# Patient Record
Sex: MALE | Race: WHITE | NOT HISPANIC OR LATINO | URBAN - METROPOLITAN AREA
[De-identification: names, ages, dates, MRNs, and addresses within clinical notes are randomized per-mention and may not be internally consistent; named-entity substitution may affect disease eponyms.]

---

## 2021-06-01 ENCOUNTER — RECORDS - HEALTHEAST (OUTPATIENT)
Dept: ADMINISTRATIVE | Facility: CLINIC | Age: 51
End: 2021-06-01

## 2023-01-20 ENCOUNTER — LAB REQUISITION (OUTPATIENT)
Dept: LAB | Facility: CLINIC | Age: 53
End: 2023-01-20
Payer: MEDICAID

## 2023-01-20 DIAGNOSIS — R63.4 ABNORMAL WEIGHT LOSS: ICD-10-CM

## 2023-01-20 LAB
ALBUMIN SERPL BCG-MCNC: 4.2 G/DL (ref 3.5–5.2)
ALP SERPL-CCNC: 89 U/L (ref 40–129)
ALT SERPL W P-5'-P-CCNC: 18 U/L (ref 10–50)
ANION GAP SERPL CALCULATED.3IONS-SCNC: 14 MMOL/L (ref 7–15)
AST SERPL W P-5'-P-CCNC: 30 U/L (ref 10–50)
BILIRUB SERPL-MCNC: 0.3 MG/DL
BUN SERPL-MCNC: 19.1 MG/DL (ref 6–20)
CALCIUM SERPL-MCNC: 9.2 MG/DL (ref 8.6–10)
CHLORIDE SERPL-SCNC: 103 MMOL/L (ref 98–107)
CREAT SERPL-MCNC: 0.75 MG/DL (ref 0.67–1.17)
DEPRECATED HCO3 PLAS-SCNC: 25 MMOL/L (ref 22–29)
GFR SERPL CREATININE-BSD FRML MDRD: >90 ML/MIN/1.73M2
GLUCOSE SERPL-MCNC: 92 MG/DL (ref 70–99)
POTASSIUM SERPL-SCNC: 3.9 MMOL/L (ref 3.4–5.3)
PROT SERPL-MCNC: 7 G/DL (ref 6.4–8.3)
SODIUM SERPL-SCNC: 142 MMOL/L (ref 136–145)

## 2023-01-20 PROCEDURE — 80053 COMPREHEN METABOLIC PANEL: CPT | Mod: ORL | Performed by: FAMILY MEDICINE

## 2025-02-06 ENCOUNTER — TRANSFERRED RECORDS (OUTPATIENT)
Dept: HEALTH INFORMATION MANAGEMENT | Facility: CLINIC | Age: 55
End: 2025-02-06

## 2025-02-07 ENCOUNTER — MEDICAL CORRESPONDENCE (OUTPATIENT)
Dept: HEALTH INFORMATION MANAGEMENT | Facility: CLINIC | Age: 55
End: 2025-02-07

## 2025-06-17 ENCOUNTER — APPOINTMENT (OUTPATIENT)
Dept: CT IMAGING | Facility: CLINIC | Age: 55
End: 2025-06-17
Attending: EMERGENCY MEDICINE
Payer: MEDICAID

## 2025-06-17 ENCOUNTER — APPOINTMENT (OUTPATIENT)
Dept: RADIOLOGY | Facility: CLINIC | Age: 55
End: 2025-06-17
Attending: EMERGENCY MEDICINE
Payer: MEDICAID

## 2025-06-17 ENCOUNTER — HOSPITAL ENCOUNTER (INPATIENT)
Facility: CLINIC | Age: 55
LOS: 1 days | Discharge: HOME | End: 2025-06-18
Attending: EMERGENCY MEDICINE
Payer: MEDICAID

## 2025-06-17 DIAGNOSIS — J18.9 PNEUMONIA DUE TO INFECTIOUS ORGANISM, UNSPECIFIED LATERALITY, UNSPECIFIED PART OF LUNG: ICD-10-CM

## 2025-06-17 DIAGNOSIS — J44.1 COPD EXACERBATION (H): ICD-10-CM

## 2025-06-17 LAB
ANION GAP SERPL CALCULATED.3IONS-SCNC: 12 MMOL/L (ref 7–15)
BASE EXCESS BLDA CALC-SCNC: 6.4 MMOL/L (ref -3–3)
BASE EXCESS BLDV CALC-SCNC: 7.2 MMOL/L (ref -3–3)
BASOPHILS # BLD AUTO: 0.1 10E3/UL (ref 0–0.2)
BASOPHILS NFR BLD AUTO: 0 %
BUN SERPL-MCNC: 14.5 MG/DL (ref 6–20)
CALCIUM SERPL-MCNC: 9.8 MG/DL (ref 8.8–10.4)
CHLORIDE SERPL-SCNC: 94 MMOL/L (ref 98–107)
CREAT SERPL-MCNC: 0.83 MG/DL (ref 0.67–1.17)
D DIMER PPP FEU-MCNC: 0.41 UG/ML FEU (ref 0–0.5)
EGFRCR SERPLBLD CKD-EPI 2021: >90 ML/MIN/1.73M2
EOSINOPHIL # BLD AUTO: 0 10E3/UL (ref 0–0.7)
EOSINOPHIL NFR BLD AUTO: 0 %
ERYTHROCYTE [DISTWIDTH] IN BLOOD BY AUTOMATED COUNT: 12.4 % (ref 10–15)
FLUAV RNA SPEC QL NAA+PROBE: NEGATIVE
FLUBV RNA RESP QL NAA+PROBE: NEGATIVE
GLUCOSE SERPL-MCNC: 112 MG/DL (ref 70–99)
HCO3 BLD-SCNC: 31 MMOL/L (ref 21–28)
HCO3 BLDV-SCNC: 35 MMOL/L (ref 21–28)
HCO3 SERPL-SCNC: 30 MMOL/L (ref 22–29)
HCT VFR BLD AUTO: 49.8 % (ref 40–53)
HGB BLD-MCNC: 16.2 G/DL (ref 13.3–17.7)
HOLD SPECIMEN: NORMAL
HOLD SPECIMEN: NORMAL
IMM GRANULOCYTES # BLD: 0.1 10E3/UL
IMM GRANULOCYTES NFR BLD: 1 %
LACTATE SERPL-SCNC: 1.3 MMOL/L (ref 0.7–2)
LACTATE SERPL-SCNC: 2.2 MMOL/L (ref 0.7–2)
LYMPHOCYTES # BLD AUTO: 1.9 10E3/UL (ref 0.8–5.3)
LYMPHOCYTES NFR BLD AUTO: 8 %
MCH RBC QN AUTO: 29 PG (ref 26.5–33)
MCHC RBC AUTO-ENTMCNC: 32.5 G/DL (ref 31.5–36.5)
MCV RBC AUTO: 89 FL (ref 78–100)
MONOCYTES # BLD AUTO: 2 10E3/UL (ref 0–1.3)
MONOCYTES NFR BLD AUTO: 9 %
NEUTROPHILS # BLD AUTO: 18.5 10E3/UL (ref 1.6–8.3)
NEUTROPHILS NFR BLD AUTO: 82 %
NRBC # BLD AUTO: 0 10E3/UL
NRBC BLD AUTO-RTO: 0 /100
O2/TOTAL GAS SETTING VFR VENT: 21 %
O2/TOTAL GAS SETTING VFR VENT: 30 %
OXYHGB MFR BLDA: 88 % (ref 92–100)
OXYHGB MFR BLDV: 42 % (ref 70–75)
PCO2 BLD: 45 MM HG (ref 35–45)
PCO2 BLDV: 58 MM HG (ref 40–50)
PEEP: 6 CM H2O
PH BLD: 7.46 [PH] (ref 7.35–7.45)
PH BLDV: 7.39 [PH] (ref 7.32–7.43)
PLAT MORPH BLD: ABNORMAL
PLATELET # BLD AUTO: 274 10E3/UL (ref 150–450)
PO2 BLD: 56 MM HG (ref 80–105)
PO2 BLDV: 25 MM HG (ref 25–47)
POTASSIUM SERPL-SCNC: 4.3 MMOL/L (ref 3.4–5.3)
RADIOLOGIST FLAGS: ABNORMAL
RBC # BLD AUTO: 5.58 10E6/UL (ref 4.4–5.9)
RBC MORPH BLD: ABNORMAL
RSV RNA SPEC NAA+PROBE: NEGATIVE
SAO2 % BLDA: 90.2 % (ref 96–97)
SAO2 % BLDV: 43.7 % (ref 70–75)
SARS-COV-2 RNA RESP QL NAA+PROBE: NEGATIVE
SODIUM SERPL-SCNC: 136 MMOL/L (ref 135–145)
TROPONIN T SERPL HS-MCNC: 12 NG/L
TROPONIN T SERPL HS-MCNC: 14 NG/L
VARIANT LYMPHS BLD QL SMEAR: PRESENT
WBC # BLD AUTO: 22.5 10E3/UL (ref 4–11)

## 2025-06-17 PROCEDURE — 250N000009 HC RX 250

## 2025-06-17 PROCEDURE — 82805 BLOOD GASES W/O2 SATURATION: CPT | Performed by: EMERGENCY MEDICINE

## 2025-06-17 PROCEDURE — 83605 ASSAY OF LACTIC ACID: CPT | Performed by: EMERGENCY MEDICINE

## 2025-06-17 PROCEDURE — 258N000003 HC RX IP 258 OP 636: Performed by: EMERGENCY MEDICINE

## 2025-06-17 PROCEDURE — 120N000001 HC R&B MED SURG/OB

## 2025-06-17 PROCEDURE — 85379 FIBRIN DEGRADATION QUANT: CPT | Performed by: EMERGENCY MEDICINE

## 2025-06-17 PROCEDURE — 85004 AUTOMATED DIFF WBC COUNT: CPT | Performed by: EMERGENCY MEDICINE

## 2025-06-17 PROCEDURE — 250N000009 HC RX 250: Performed by: EMERGENCY MEDICINE

## 2025-06-17 PROCEDURE — 71045 X-RAY EXAM CHEST 1 VIEW: CPT

## 2025-06-17 PROCEDURE — 94640 AIRWAY INHALATION TREATMENT: CPT

## 2025-06-17 PROCEDURE — 96375 TX/PRO/DX INJ NEW DRUG ADDON: CPT

## 2025-06-17 PROCEDURE — 94660 CPAP INITIATION&MGMT: CPT

## 2025-06-17 PROCEDURE — 96361 HYDRATE IV INFUSION ADD-ON: CPT

## 2025-06-17 PROCEDURE — 250N000011 HC RX IP 250 OP 636: Performed by: EMERGENCY MEDICINE

## 2025-06-17 PROCEDURE — 36415 COLL VENOUS BLD VENIPUNCTURE: CPT | Performed by: EMERGENCY MEDICINE

## 2025-06-17 PROCEDURE — 84484 ASSAY OF TROPONIN QUANT: CPT | Performed by: EMERGENCY MEDICINE

## 2025-06-17 PROCEDURE — 36600 WITHDRAWAL OF ARTERIAL BLOOD: CPT

## 2025-06-17 PROCEDURE — 80048 BASIC METABOLIC PNL TOTAL CA: CPT | Performed by: EMERGENCY MEDICINE

## 2025-06-17 PROCEDURE — 96374 THER/PROPH/DIAG INJ IV PUSH: CPT

## 2025-06-17 PROCEDURE — 250N000011 HC RX IP 250 OP 636

## 2025-06-17 PROCEDURE — 99291 CRITICAL CARE FIRST HOUR: CPT | Mod: 25

## 2025-06-17 PROCEDURE — 87637 SARSCOV2&INF A&B&RSV AMP PRB: CPT | Performed by: EMERGENCY MEDICINE

## 2025-06-17 PROCEDURE — 71250 CT THORAX DX C-: CPT

## 2025-06-17 PROCEDURE — 94640 AIRWAY INHALATION TREATMENT: CPT | Mod: 76

## 2025-06-17 PROCEDURE — 5A09357 ASSISTANCE WITH RESPIRATORY VENTILATION, LESS THAN 24 CONSECUTIVE HOURS, CONTINUOUS POSITIVE AIRWAY PRESSURE: ICD-10-PCS | Performed by: EMERGENCY MEDICINE

## 2025-06-17 PROCEDURE — 999N000157 HC STATISTIC RCP TIME EA 10 MIN

## 2025-06-17 RX ORDER — METHYLPREDNISOLONE SODIUM SUCCINATE 125 MG/2ML
125 INJECTION INTRAMUSCULAR; INTRAVENOUS ONCE
Status: COMPLETED | OUTPATIENT
Start: 2025-06-17 | End: 2025-06-17

## 2025-06-17 RX ORDER — AMOXICILLIN 250 MG
1 CAPSULE ORAL 2 TIMES DAILY PRN
Status: DISCONTINUED | OUTPATIENT
Start: 2025-06-17 | End: 2025-06-18 | Stop reason: HOSPADM

## 2025-06-17 RX ORDER — AMOXICILLIN 250 MG
2 CAPSULE ORAL 2 TIMES DAILY PRN
Status: DISCONTINUED | OUTPATIENT
Start: 2025-06-17 | End: 2025-06-18 | Stop reason: HOSPADM

## 2025-06-17 RX ORDER — CEFTRIAXONE 1 G/1
1 INJECTION, POWDER, FOR SOLUTION INTRAMUSCULAR; INTRAVENOUS ONCE
Status: COMPLETED | OUTPATIENT
Start: 2025-06-17 | End: 2025-06-17

## 2025-06-17 RX ORDER — CALCIUM CARBONATE 500 MG/1
1000 TABLET, CHEWABLE ORAL 4 TIMES DAILY PRN
Status: DISCONTINUED | OUTPATIENT
Start: 2025-06-17 | End: 2025-06-18 | Stop reason: HOSPADM

## 2025-06-17 RX ORDER — IPRATROPIUM BROMIDE AND ALBUTEROL SULFATE 2.5; .5 MG/3ML; MG/3ML
3 SOLUTION RESPIRATORY (INHALATION) ONCE
Status: COMPLETED | OUTPATIENT
Start: 2025-06-17 | End: 2025-06-17

## 2025-06-17 RX ORDER — ENOXAPARIN SODIUM 100 MG/ML
40 INJECTION SUBCUTANEOUS EVERY 24 HOURS
Status: DISCONTINUED | OUTPATIENT
Start: 2025-06-17 | End: 2025-06-18 | Stop reason: HOSPADM

## 2025-06-17 RX ORDER — CEFTRIAXONE 1 G/1
1 INJECTION, POWDER, FOR SOLUTION INTRAMUSCULAR; INTRAVENOUS EVERY 24 HOURS
Status: DISCONTINUED | OUTPATIENT
Start: 2025-06-18 | End: 2025-06-18 | Stop reason: HOSPADM

## 2025-06-17 RX ORDER — PREDNISONE 20 MG/1
40 TABLET ORAL DAILY
Status: DISCONTINUED | OUTPATIENT
Start: 2025-06-18 | End: 2025-06-18 | Stop reason: HOSPADM

## 2025-06-17 RX ORDER — IBUPROFEN 600 MG/1
600 TABLET, FILM COATED ORAL EVERY 6 HOURS PRN
Status: DISCONTINUED | OUTPATIENT
Start: 2025-06-17 | End: 2025-06-18 | Stop reason: HOSPADM

## 2025-06-17 RX ORDER — ALBUTEROL SULFATE 2.5 MG/.5ML
2.5 SOLUTION RESPIRATORY (INHALATION) EVERY 4 HOURS PRN
COMMUNITY

## 2025-06-17 RX ORDER — ACETAMINOPHEN 325 MG/1
650 TABLET ORAL EVERY 4 HOURS PRN
Status: DISCONTINUED | OUTPATIENT
Start: 2025-06-17 | End: 2025-06-18 | Stop reason: HOSPADM

## 2025-06-17 RX ORDER — ACETAMINOPHEN 650 MG/1
650 SUPPOSITORY RECTAL EVERY 4 HOURS PRN
Status: DISCONTINUED | OUTPATIENT
Start: 2025-06-17 | End: 2025-06-18 | Stop reason: HOSPADM

## 2025-06-17 RX ORDER — NICOTINE 21 MG/24HR
1 PATCH, TRANSDERMAL 24 HOURS TRANSDERMAL DAILY PRN
Status: DISCONTINUED | OUTPATIENT
Start: 2025-06-17 | End: 2025-06-18 | Stop reason: HOSPADM

## 2025-06-17 RX ORDER — AZITHROMYCIN 500 MG/5ML
500 INJECTION, POWDER, LYOPHILIZED, FOR SOLUTION INTRAVENOUS ONCE
Status: COMPLETED | OUTPATIENT
Start: 2025-06-17 | End: 2025-06-17

## 2025-06-17 RX ORDER — IPRATROPIUM BROMIDE AND ALBUTEROL SULFATE 2.5; .5 MG/3ML; MG/3ML
3 SOLUTION RESPIRATORY (INHALATION)
Status: DISCONTINUED | OUTPATIENT
Start: 2025-06-17 | End: 2025-06-18 | Stop reason: HOSPADM

## 2025-06-17 RX ORDER — AZITHROMYCIN 250 MG/1
250 TABLET, FILM COATED ORAL DAILY
Status: DISCONTINUED | OUTPATIENT
Start: 2025-06-18 | End: 2025-06-18 | Stop reason: HOSPADM

## 2025-06-17 RX ORDER — LIDOCAINE 40 MG/G
CREAM TOPICAL
Status: DISCONTINUED | OUTPATIENT
Start: 2025-06-17 | End: 2025-06-18 | Stop reason: HOSPADM

## 2025-06-17 RX ORDER — ALBUTEROL SULFATE 90 UG/1
2 INHALANT RESPIRATORY (INHALATION) EVERY 4 HOURS PRN
COMMUNITY

## 2025-06-17 RX ADMIN — IPRATROPIUM BROMIDE AND ALBUTEROL SULFATE 3 ML: .5; 3 SOLUTION RESPIRATORY (INHALATION) at 15:07

## 2025-06-17 RX ADMIN — IPRATROPIUM BROMIDE AND ALBUTEROL SULFATE 3 ML: .5; 3 SOLUTION RESPIRATORY (INHALATION) at 22:12

## 2025-06-17 RX ADMIN — AZITHROMYCIN MONOHYDRATE 500 MG: 500 INJECTION, POWDER, LYOPHILIZED, FOR SOLUTION INTRAVENOUS at 20:36

## 2025-06-17 RX ADMIN — ENOXAPARIN SODIUM 40 MG: 40 INJECTION SUBCUTANEOUS at 22:08

## 2025-06-17 RX ADMIN — SODIUM CHLORIDE 500 ML: 0.9 INJECTION, SOLUTION INTRAVENOUS at 16:33

## 2025-06-17 RX ADMIN — METHYLPREDNISOLONE SODIUM SUCCINATE 125 MG: 125 INJECTION, POWDER, FOR SOLUTION INTRAMUSCULAR; INTRAVENOUS at 15:14

## 2025-06-17 RX ADMIN — CEFTRIAXONE 1 G: 1 INJECTION, POWDER, FOR SOLUTION INTRAMUSCULAR; INTRAVENOUS at 19:53

## 2025-06-17 ASSESSMENT — ACTIVITIES OF DAILY LIVING (ADL)
ADLS_ACUITY_SCORE: 41

## 2025-06-17 ASSESSMENT — COLUMBIA-SUICIDE SEVERITY RATING SCALE - C-SSRS
BASED ON RESPONSES TO C-SSRS QS 1-6, WHAT IS THE PATIENT'S OVERALL RISK RATING FOR SUICIDE: MODERATE RISK
4. HAVE YOU HAD THESE THOUGHTS AND HAD SOME INTENTION OF ACTING ON THEM?: NO
1. IN THE PAST MONTH, HAVE YOU WISHED YOU WERE DEAD OR WISHED YOU COULD GO TO SLEEP AND NOT WAKE UP?: YES
2. HAVE YOU ACTUALLY HAD ANY THOUGHTS OF KILLING YOURSELF IN THE PAST MONTH?: YES
3. HAVE YOU BEEN THINKING ABOUT HOW YOU MIGHT KILL YOURSELF?: YES
5. HAVE YOU STARTED TO WORK OUT OR WORKED OUT THE DETAILS OF HOW TO KILL YOURSELF? DO YOU INTEND TO CARRY OUT THIS PLAN?: NO
6. HAVE YOU EVER DONE ANYTHING, STARTED TO DO ANYTHING, OR PREPARED TO DO ANYTHING TO END YOUR LIFE?: YES

## 2025-06-17 NOTE — ED TRIAGE NOTES
The patient presents to the ED with shortness of breath that has been worsening over the last couple of days. History of emphysema. He is working with his doctor to get an evaluation for home oxygen if needed. The patient reports he has congestion and cannot cough anything up. Patient tachypniec in triage. He did a neb at home prior to coming in. He denies fever.

## 2025-06-17 NOTE — ED NOTES
Pt was placed on BIPAP for increased WOB, 12/6 RR14 30% 02, duoneb x2 given, BS expiratory wheezes. ABG drawn on current BIPAP settings, results: 7.46/45/56/31. BIPAP taken off post ABG drawn, pt remains on 3L NC. Pt's family member stated he has a history of emphysema and smokes ~3 ppd. RT following.

## 2025-06-17 NOTE — ED PROVIDER NOTES
EMERGENCY DEPARTMENT ENCOUNTER      NAME: Satnam Kaminski  AGE: 54 year old male  YOB: 1970  MRN: [id number]  EVALUATION DATE & TIME: 6/17/2025  2:47 PM    PCP: Mickey Perry    ED PROVIDER: Reynold Fuentes D.O.      Chief Complaint   Patient presents with    Shortness of Breath       FINAL IMPRESSION:  1. COPD exacerbation (H)    2. Pneumonia due to infectious organism, unspecified laterality, unspecified part of lung        ED COURSE & MEDICAL DECISION MAKING:    3:00 PM I met with the patient to gather history and to perform my initial exam. I discussed the plan for care while in the Emergency Department.  5:47 PM I checked on the patient.   5:55 PM I spoke with the patient. He adamantly states that he does not want to be admitted. Plan to get non contrast CT of chest.   7:49 PM discussed results of CT with patient.  He is now agreeable to admission.  Will start IV antibiotics.  8:00 PM spoke with Dr. Cole, [facility] residency team, who agrees to admission         Pertinent Labs & Imaging studies reviewed. (See chart for details)  54 year old male presents to the Emergency Department for evaluation of a history of shortness of breath with known history of COPD.  Initial differential did include COPD exacerbation, pneumonia, PE, pneumothorax, other acute process.  Less likely to represent ACS.  EKG did not show any evidence of ischemia arrhythmia, first troponin was 12, second was 14, doubt ACS at this time.  D-dimer was negative, unlikely represent PE.  Patient was initially placed on a BiPAP and DuoNebs were given due to his wheezing, we were eventually able to wean him off the DuoNeb, and placed him on supplemental oxygen to keep his O2 sats up.  Patient did have a severely elevated white count, and is not currently on steroids, therefore was concern for the potential for infection.  Chest x-ray was fairly unremarkable, therefore I decided to perform a CT scan without contrast  to evaluate for possible pneumonia that was not seen on previous imaging.  Unfortunately does appear the patient does have some areas of bronchopneumonia on top of his emphysema which I believe likely was the trigger of his COPD exacerbation tonight.  Therefore, at this time I believe the patient would benefit from inpatient admission for IV antibiotics and further management on the floor.  Patient was comfortable with this plan.    Medical Decision Making  I reviewed the EMR: Outpatient Record: 06/02/2025, [facility]  Care impacted by Alcohol and/or Drug Abuse or Dependence, Chronic Lung Disease, Chronic Pain, Mental Health, and Smoking / Nicotine Use  Admit.    MIPS (CTPE, Dental pain, Hernandez, Sinusitis, Asthma/COPD, Head Trauma): Asthma or COPD Exacerbation:Smoking Cessation Counseling: Patient is a current smoker and received smoking cessation instructions/education at discharge.     SEPSIS: None          At the conclusion of the encounter I discussed the results of all of the tests and the disposition. The questions were answered. The patient or family acknowledged understanding and was agreeable with the care plan.      CRITICAL CARE:  Critical Care  Performed by: PRESLEY HAIDER  Authorized by: PRESLEY HAIDER  Total critical care time: 35 minutes  Critical care time was exclusive of separately billable procedures and treating other patients.  Critical care was necessary to treat or prevent imminent or life-threatening deterioration of the following conditions: COPD exacerbation  Critical care was time spent personally by me on the following activities: development of treatment plan with patient or surrogate, discussions with consultants, examination of patient, evaluation of patient's response to treatment, obtaining history from patient or surrogate, ordering and performing treatments and interventions, ordering and review of laboratory studies, ordering and review of  "radiographic studies and re-evaluation of patient's condition, this excludes any separately billable procedures.        HPI    Patient information was obtained from: Patient.     Use of : N/A.        Satnam Kaminski is a 54 year old male who presents with shortness of breath.     Per Chart Review, 06/02/2025, [facility]: Patient was seen for a COPD exacerbation. Patient given a nebulizer treatment and a dose of oral steroid in the ER. X-ray, viral swabs, and EKG unremarkable. Patient improved in the ED and sent home with prednisone and albuterol.     Patient endorses shortness of breath that started a few days ago. He notes that his \"oxygen has been low\". He mentioned some chest pain that started yesterday and worsening of all of his symptoms. He feels like he has to cough something up but cannot. Patient smokes about 3 packs of cigarettes daily but has reduced this recently due to his symptoms.     Patient denied any nausea, vomiting, diarrhea, fever, and did not mention any other symptoms/ concerns.     PAST MEDICAL HISTORY:  No past medical history on file.    PAST SURGICAL HISTORY:  No past surgical history on file.      CURRENT MEDICATIONS:    Current Facility-Administered Medications   Medication Dose Route Frequency Provider Last Rate Last Admin    azithromycin (ZITHROMAX) 500 mg in  mL intermittent infusion  500 mg Intravenous Once Reynold Fuentes,         cefTRIAXone (ROCEPHIN) 1 g vial to attach to  mL bag for ADULTS or NS 50 mL bag for PEDS  1 g Intravenous Once Reynold Fuentes DO         Current Outpatient Medications   Medication Sig Dispense Refill    metaxalone (SKELAXIN) 800 MG tablet [METAXALONE (SKELAXIN) 800 MG TABLET] 1 tab po tid prn muscle spasm 21 tablet 0         ALLERGIES:  Allergies   Allergen Reactions    Doxycycline Hyclate [Doxycycline] Unknown       FAMILY HISTORY:  No family history on file.    SOCIAL HISTORY:  Social " "History     Socioeconomic History    Marital status: Legally    Tobacco Use    Smoking status: Some Days     Types: Cigarettes       VITALS:  Patient Vitals for the past 24 hrs:   BP Temp Temp src Pulse Resp SpO2 Height Weight   06/17/25 1902 (!) 147/83 -- -- 107 (!) 35 (!) 87 % -- --   06/17/25 1831 135/86 -- -- 102 18 (!) 89 % -- --   06/17/25 1758 126/83 -- -- 102 19 (!) 91 % -- --   06/17/25 1730 (!) 147/75 -- -- 103 13 (!) 91 % -- --   06/17/25 1700 (!) 148/88 -- -- 110 26 (!) 90 % -- --   06/17/25 1630 (!) 146/107 -- -- 115 28 (!) 89 % -- --   06/17/25 1622 -- -- -- (!) 122 30 92 % -- --   06/17/25 1600 (!) 143/90 -- -- (!) 122 30 93 % -- --   06/17/25 1532 137/73 -- -- (!) 122 17 94 % -- --   06/17/25 1517 (!) 146/77 -- -- (!) 123 27 95 % -- --   06/17/25 1506 -- -- -- -- -- 97 % -- --   06/17/25 1443 (!) 133/92 98.3  F (36.8  C) Oral (!) 128 28 94 % 1.803 m (5' 11\") 66.7 kg (147 lb)       PHYSICAL EXAM    VITAL SIGNS: BP (!) 147/83   Pulse 107   Temp 98.3  F (36.8  C) (Oral)   Resp (!) 35   Ht 1.803 m (5' 11\")   Wt 66.7 kg (147 lb)   SpO2 (!) 87%   BMI 20.50 kg/m      General Appearance: Well-appearing, well-nourished, no acute distress   Head:  Normocephalic, without obvious abnormality, atraumatic  Eyes:  PERRL, conjunctiva/corneas clear, EOM's intact,  ENT:  Lips, mucosa, and tongue normal, membranes are moist without pallor  Neck:  Normal ROM, symmetrical, trachea midline    Cardio:  Regular rhythm, no murmur, rub or gallop, 2+ pulses symmetric in all extremities. Tachycardic   Pulm:  Diffuse wheezing bilaterally with increased work of breathing.  Abdomen:  Soft, non-tender, no rebound or guarding.  Musculoskeletal: Full ROM, no edema, no cyanosis, good ROM of major joints  Integument:  Warm, Dry, No erythema, No rash.    Neurologic:  Alert & oriented.  No focal deficits appreciated.    Psychiatric:  Affect normal, Judgment normal, Mood normal.      LABS  Results for orders placed or " performed during the hospital encounter of 06/17/25 (from the past 24 hours)   CBC with platelets + differential    Narrative    The following orders were created for panel order CBC with platelets + differential.  Procedure                               Abnormality         Status                     ---------                               -----------         ------                     CBC with platelets and ...[[id number]]  Abnormal            Final result               RBC and Platelet Morpho...[[id number]]  Abnormal            Final result                 Please view results for these tests on the individual orders.   Basic metabolic panel   Result Value Ref Range    Sodium 136 135 - 145 mmol/L    Potassium 4.3 3.4 - 5.3 mmol/L    Chloride 94 (L) 98 - 107 mmol/L    Carbon Dioxide (CO2) 30 (H) 22 - 29 mmol/L    Anion Gap 12 7 - 15 mmol/L    Urea Nitrogen 14.5 6.0 - 20.0 mg/dL    Creatinine 0.83 0.67 - 1.17 mg/dL    GFR Estimate >90 >60 mL/min/1.73m2    Calcium 9.8 8.8 - 10.4 mg/dL    Glucose 112 (H) 70 - 99 mg/dL   Troponin T, High Sensitivity   Result Value Ref Range    Troponin T, High Sensitivity 14 <=22 ng/L   D dimer quantitative   Result Value Ref Range    D-Dimer Quantitative 0.41 0.00 - 0.50 ug/mL FEU    Narrative    This D-dimer assay is intended for use in conjunction with a clinical pretest probability assessment model to exclude pulmonary embolism (PE) and deep venous thrombosis (DVT) in outpatients suspected of PE or DVT. The cut-off value is 0.50 ug/mL FEU.    For patients 50 years of age or older, the application of age-adjusted cut-off values for D-Dimer may increase the specificity without significant effect on sensitivity. The literature suggested calculation age adjusted cut-off in ug/L = age in years x 10 ug/L. The results in this laboratory are reported as ug/mL rather than ug/L. The calculation for age adjusted cut off in ug/mL= age in years x 0.01 ug/mL. For example, the cut off for a 76  year old male is 75 x 0.01 ug/mL = 0.76 ug/mL (760 ug/L).    M Munir et al. Age adjusted D-dimer cut-off levels to rule out pulmonary embolism: The ADJUST-PE Study. JUAN 2014;311:8909-1163.; HJ Ayde et al. Diagnostic accuracy of conventional or age adjusted D-dimer cutoff values in older patients with suspected venous thromboembolism. Systemic review and meta-analysis. BMJ 2013:346:f2492.   Influenza A/B, RSV and SARS-CoV2 PCR (COVID-19) Nose    Specimen: Nose; Swab   Result Value Ref Range    Influenza A PCR Negative Negative    Influenza B PCR Negative Negative    RSV PCR Negative Negative    SARS CoV2 PCR Negative Negative    Narrative    Testing was performed using the Xpert Xpress CoV2/Flu/RSV Assay on the e[employer]3Dpert Instrument. This test should be ordered for the detection of SARS-CoV2, influenza, and RSV viruses in individuals with signs and symptoms of respiratory tract infection. This test is for in vitro diagnostic use under the [place] FDA for laboratories certified under CLIA to perform high or moderate complexity testing. This test has been [place] FDA cleared. A negative result does not rule out the presence of PCR inhibitors in the specimen or target RNA in concentration below the limit of detection for the assay. If only one viral target is positive but coinfection with multiple targets is suspected, the sample should be re-tested with another FDA cleared, approved, or authorized test, if coninfection would change clinical management. This test was validated by the [facility]. These laboratories are certified under the Clinical Laboratory Improvement Amendments of 1988 (CLIA-88) as qualified to perfom high complexity laboratory testing.   CBC with platelets and differential   Result Value Ref Range    WBC Count 22.5 (H) 4.0 - 11.0 10e3/uL    RBC Count 5.58 4.40 - 5.90 10e6/uL    Hemoglobin 16.2 13.3 - 17.7 g/dL    Hematocrit 49.8 40.0 - 53.0 %    MCV 89 78 - 100 fL    MCH 29.0  26.5 - 33.0 pg    MCHC 32.5 31.5 - 36.5 g/dL    RDW 12.4 10.0 - 15.0 %    Platelet Count 274 150 - 450 10e3/uL    % Neutrophils 82 %    % Lymphocytes 8 %    % Monocytes 9 %    % Eosinophils 0 %    % Basophils 0 %    % Immature Granulocytes 1 %    NRBCs per 100 WBC 0 <1 /100    Absolute Neutrophils 18.5 (H) 1.6 - 8.3 10e3/uL    Absolute Lymphocytes 1.9 0.8 - 5.3 10e3/uL    Absolute Monocytes 2.0 (H) 0.0 - 1.3 10e3/uL    Absolute Eosinophils 0.0 0.0 - 0.7 10e3/uL    Absolute Basophils 0.1 0.0 - 0.2 10e3/uL    Absolute Immature Granulocytes 0.1 <=0.4 10e3/uL    Absolute NRBCs 0.0 10e3/uL   RBC and Platelet Morphology   Result Value Ref Range    RBC Morphology Confirmed RBC Indices     Platelet Assessment  Automated Count Confirmed. Platelet morphology is normal.     Automated Count Confirmed. Platelet morphology is normal.    Reactive Lymphocytes Present (A) None Seen   [place] Draw    Narrative    The following orders were created for panel order [place] Draw.  Procedure                               Abnormality         Status                     ---------                               -----------         ------                     Extra Red Top Tube[[id number]]                              Final result               Extra Heparinized Syringe[[id number]]                       Final result                 Please view results for these tests on the individual orders.   Extra Red Top Tube   Result Value Ref Range    Hold Specimen JIC    Extra Heparinized Syringe   Result Value Ref Range    Hold Specimen JIC    Lactic Acid Whole Blood with 1X Repeat in 2 HR when >2   Result Value Ref Range    Lactic Acid, Initial 2.2 (H) 0.7 - 2.0 mmol/L   Blood gas venous   Result Value Ref Range    pH Venous 7.39 7.32 - 7.43    pCO2 Venous 58 (H) 40 - 50 mm Hg    pO2 Venous 25 25 - 47 mm Hg    Bicarbonate Venous 35 (H) 21 - 28 mmol/L    Base Excess/Deficit Venous 7.2 (H) -3.0 - 3.0 mmol/L    FIO2 21     Oxyhemoglobin Venous 42 (L) 70 -  75 %    O2 Sat, Venous 43.7 (L) 70.0 - 75.0 %    Narrative    In healthy individuals, oxyhemoglobin (O2Hb) and oxygen saturation (SO2) are approximately equal. In the presence of dyshemoglobins, oxyhemoglobin can be considerably lower than oxygen saturation.   XR Chest Port 1 View    Narrative    EXAM: XR CHEST PORT 1 VIEW  LOCATION: [facility]  DATE: 6/17/2025    INDICATION: Dyspnea  COMPARISON: 6/2/2024.      Impression    IMPRESSION: Mild hyperinflation with some minimal fibrosis. Chest otherwise negative. No acute findings.   Blood gas arterial   Result Value Ref Range    pH Arterial 7.46 (H) 7.35 - 7.45    pCO2 Arterial 45 35 - 45 mm Hg    pO2 Arterial 56 (L) 80 - 105 mm Hg    FIO2 30     Bicarbonate Arterial 31 (H) 21 - 28 mmol/L    Base Excess/Deficit Arterial 6.4 (H) -3.0 - 3.0 mmol/L    Oxyhemoglobin Arterial 88 (L) 92 - 100 %    O2 Sat, Arterial 90.2 (L) 96.0 - 97.0 %    Peep 6 cm H2O    Narrative    In healthy individuals, oxyhemoglobin (O2Hb) and oxygen saturation (SO2) are approximately equal. In the presence of dyshemoglobins, oxyhemoglobin can be considerably lower than oxygen saturation.   Troponin T, High Sensitivity   Result Value Ref Range    Troponin T, High Sensitivity 12 <=22 ng/L   Lactic acid whole blood   Result Value Ref Range    Lactic Acid 1.3 0.7 - 2.0 mmol/L   Chest CT w/o contrast   Result Value Ref Range    Radiologist flags See above report. (Urgent)     Narrative    EXAM: CT CHEST W/O CONTRAST  LOCATION: [facility]  DATE: 6/17/2025    INDICATION: Dyspnea, concern for PNA not seen on CXR  COMPARISON: Chest x-ray earlier today  TECHNIQUE: CT chest without IV contrast. Multiplanar reformats were obtained. Dose reduction techniques were used.  CONTRAST: None.    FINDINGS:   LUNGS AND PLEURA: Pulmonary abnormalities are noted bilaterally. Specifically, peribronchiolar groundglass opacities noted bilaterally with more focal areas of  heterogeneous opacification with thickened walls about the extensive emphysema. This is most   notable superiorly in the left upper lobe (axial image 88) and multiple smaller areas throughout the left lower lobe (axial images 2 76-91). Peribronchial thickening with areas of retained secretions noted in both lower lobe bronchi as well as the   subsegmental branches where many are occluded in the lower lobes. No effusions.    MEDIASTINUM/AXILLAE: Saber-sheath trachea. No concerning adenopathy. Aorta is normal caliber.      CORONARY ARTERY CALCIFICATION: None.    UPPER ABDOMEN: Unremarkable.    MUSCULOSKELETAL: No concerning bone lesions.      Impression    IMPRESSION:   1.  Patient has  marked emphysema and changes of acute bronchitis and bronchiolitis with small areas of bronchopneumonia and retained endobronchial secretions, greatest in the lower lobes.  2.  Suggest a follow-up chest CT posttreatment in 3 months.    [Access Center: See above report.]    This report will be copied to the [facility] to ensure a provider acknowledges the finding. Access Center is available Monday through Friday 8am-3:30 pm.              RADIOLOGY  Chest CT w/o contrast   Final Result   Abnormal   IMPRESSION:    1.  Patient has  marked emphysema and changes of acute bronchitis and bronchiolitis with small areas of bronchopneumonia and retained endobronchial secretions, greatest in the lower lobes.   2.  Suggest a follow-up chest CT posttreatment in 3 months.      [Access Center: See above report.]      This report will be copied to the [facility] to ensure a provider acknowledges the finding. Access Center is available Monday through Friday 8am-3:30 pm.             XR Chest Port 1 View   Final Result   IMPRESSION: Mild hyperinflation with some minimal fibrosis. Chest otherwise negative. No acute findings.             EKG:    Rate: 134 bpm  Rhythm: Sinus tachycardia  Axis: left  Interval: Normal  Conduction:  Normal  QRS: Normal  ST: Normal  T-wave: Normal  QT: Not prolonged  Comparison EKG: No previous available for comparison  Impression:  No acute ischemic change   I have independently reviewed and interpreted today's EKG, pending Cardiologist read          MEDICATIONS GIVEN IN THE EMERGENCY:  Medications   cefTRIAXone (ROCEPHIN) 1 g vial to attach to  mL bag for ADULTS or NS 50 mL bag for PEDS (has no administration in time range)   azithromycin (ZITHROMAX) 500 mg in  mL intermittent infusion (has no administration in time range)   ipratropium - albuterol 0.5 mg/2.5 mg (3mg)/3 mL (DUONEB) neb solution 3 mL (3 mLs Nebulization $Given 6/17/25 1507)   ipratropium - albuterol 0.5 mg/2.5 mg (3mg)/3 mL (DUONEB) neb solution 3 mL (3 mLs Nebulization $Given 6/17/25 1507)   methylPREDNISolone Na Suc (solu-MEDROL) injection 125 mg (125 mg Intravenous $Given 6/17/25 1514)   sodium chloride 0.9% BOLUS 500 mL (0 mLs Intravenous Stopped 6/17/25 1720)       NEW PRESCRIPTIONS STARTED AT TODAY'S ER VISIT  New Prescriptions    No medications on file        I, Mingo Flores, am serving as a scribe to document services personally performed by Reynold Fuentes D.O., based on my observations and the provider's statements to me.  I, Reynold Fuentes D.O., attest that Mingo Flores is acting in a scribe capacity, has observed my performance of the services and has documented them in accordance with my direction.     Reynold Fuentes D.O.  Emergency Medicine  [facility] EMERGENCY ROOM  [address] [address]  [phone]  Dept: [phone]      Reynold Fuentes,   06/17/25 2114

## 2025-06-17 NOTE — ED NOTES
"ED Triage Provider Note  [facility] EMERGENCY ROOM  Encounter Date: Jun 17, 2025    History:  Chief Complaint   Patient presents with    Shortness of Breath     Satnam Kaminski is a 54 year old male who presents to the ED with ***. {include 1-3 HPI elements}    Review of Systems:  {1 item required}    Exam:  BP (!) 147/75   Pulse 103   Temp 98.3  F (36.8  C) (Oral)   Resp 13   Ht 1.803 m (5' 11\")   Wt 66.7 kg (147 lb)   SpO2 (!) 91%   BMI 20.50 kg/m    General: No acute distress. Appears stated age.   Cardio: {normal/tachy/willy:513047::\"normal\"} rate, extremities well perfused  Resp: Normal work of breathing, grossly normal respiratory rate  Neuro: Alert. Facial movement grossly symmetric. Grossly intact strength.   {edit exam as needed, may add problem pertinent system}    Medical Decision Making:  Patient arriving to the ED with problem as above. A medical screening exam was performed. {Initial differential:835355::\"Initial differential diagnosis includes but not limited to ***.\"}    *** orders initiated from Triage. The patient is most appropriate to {ED Triage Destination:242355}.       Kandis Fontanez RN  6/17/2025 at 6:03 PM  "

## 2025-06-18 ENCOUNTER — APPOINTMENT (OUTPATIENT)
Dept: CARDIOLOGY | Facility: CLINIC | Age: 55
End: 2025-06-18
Attending: DIETITIAN, REGISTERED
Payer: MEDICAID

## 2025-06-18 VITALS
OXYGEN SATURATION: 95 % | BODY MASS INDEX: 20.58 KG/M2 | RESPIRATION RATE: 20 BRPM | SYSTOLIC BLOOD PRESSURE: 147 MMHG | DIASTOLIC BLOOD PRESSURE: 84 MMHG | TEMPERATURE: 98.2 F | HEIGHT: 71 IN | HEART RATE: 102 BPM | WEIGHT: 147 LBS

## 2025-06-18 LAB
ALBUMIN SERPL BCG-MCNC: 3.8 G/DL (ref 3.5–5.2)
ALP SERPL-CCNC: 100 U/L (ref 40–150)
ALT SERPL W P-5'-P-CCNC: 12 U/L (ref 0–70)
ANION GAP SERPL CALCULATED.3IONS-SCNC: 11 MMOL/L (ref 7–15)
AST SERPL W P-5'-P-CCNC: 19 U/L (ref 0–45)
BILIRUB SERPL-MCNC: 0.3 MG/DL
BUN SERPL-MCNC: 20.4 MG/DL (ref 6–20)
CALCIUM SERPL-MCNC: 10.3 MG/DL (ref 8.8–10.4)
CHLORIDE SERPL-SCNC: 99 MMOL/L (ref 98–107)
CREAT SERPL-MCNC: 0.8 MG/DL (ref 0.67–1.17)
EGFRCR SERPLBLD CKD-EPI 2021: >90 ML/MIN/1.73M2
ERYTHROCYTE [DISTWIDTH] IN BLOOD BY AUTOMATED COUNT: 12.3 % (ref 10–15)
GLUCOSE SERPL-MCNC: 139 MG/DL (ref 70–99)
HCO3 SERPL-SCNC: 31 MMOL/L (ref 22–29)
HCT VFR BLD AUTO: 52.5 % (ref 40–53)
HGB BLD-MCNC: 16.7 G/DL (ref 13.3–17.7)
MCH RBC QN AUTO: 28.9 PG (ref 26.5–33)
MCHC RBC AUTO-ENTMCNC: 31.8 G/DL (ref 31.5–36.5)
MCV RBC AUTO: 91 FL (ref 78–100)
NT-PROBNP SERPL-MCNC: 142 PG/ML (ref 0–177)
PLATELET # BLD AUTO: 267 10E3/UL (ref 150–450)
POTASSIUM SERPL-SCNC: 5.1 MMOL/L (ref 3.4–5.3)
POTASSIUM SERPL-SCNC: 6 MMOL/L (ref 3.4–5.3)
PROT SERPL-MCNC: 7.9 G/DL (ref 6.4–8.3)
RBC # BLD AUTO: 5.78 10E6/UL (ref 4.4–5.9)
SODIUM SERPL-SCNC: 141 MMOL/L (ref 135–145)
WBC # BLD AUTO: 19.5 10E3/UL (ref 4–11)

## 2025-06-18 PROCEDURE — 36415 COLL VENOUS BLD VENIPUNCTURE: CPT | Performed by: DIETITIAN, REGISTERED

## 2025-06-18 PROCEDURE — 93306 TTE W/DOPPLER COMPLETE: CPT

## 2025-06-18 PROCEDURE — 94640 AIRWAY INHALATION TREATMENT: CPT | Mod: 76

## 2025-06-18 PROCEDURE — 250N000009 HC RX 250

## 2025-06-18 PROCEDURE — 99223 1ST HOSP IP/OBS HIGH 75: CPT | Mod: AI

## 2025-06-18 PROCEDURE — 999N000157 HC STATISTIC RCP TIME EA 10 MIN

## 2025-06-18 PROCEDURE — 250N000012 HC RX MED GY IP 250 OP 636 PS 637

## 2025-06-18 PROCEDURE — 272N000202 HC AEROBIKA WITH MANOMETER

## 2025-06-18 PROCEDURE — 93306 TTE W/DOPPLER COMPLETE: CPT | Mod: 26 | Performed by: INTERNAL MEDICINE

## 2025-06-18 PROCEDURE — 94799 UNLISTED PULMONARY SVC/PX: CPT

## 2025-06-18 PROCEDURE — 82310 ASSAY OF CALCIUM: CPT

## 2025-06-18 PROCEDURE — 94640 AIRWAY INHALATION TREATMENT: CPT

## 2025-06-18 PROCEDURE — 250N000009 HC RX 250: Performed by: INTERNAL MEDICINE

## 2025-06-18 PROCEDURE — 999N000111 HC STATISTIC OT IP EVAL DEFER: Performed by: OCCUPATIONAL THERAPIST

## 2025-06-18 PROCEDURE — 83880 ASSAY OF NATRIURETIC PEPTIDE: CPT | Performed by: DIETITIAN, REGISTERED

## 2025-06-18 PROCEDURE — 999N000156 HC STATISTIC RCP CONSULT EA 30 MIN

## 2025-06-18 PROCEDURE — 36415 COLL VENOUS BLD VENIPUNCTURE: CPT

## 2025-06-18 PROCEDURE — 85027 COMPLETE CBC AUTOMATED: CPT

## 2025-06-18 PROCEDURE — 84132 ASSAY OF SERUM POTASSIUM: CPT | Performed by: DIETITIAN, REGISTERED

## 2025-06-18 RX ORDER — ACETYLCYSTEINE 200 MG/ML
2 SOLUTION ORAL; RESPIRATORY (INHALATION) EVERY 4 HOURS
Qty: 30 ML | Refills: 3 | Status: SHIPPED | OUTPATIENT
Start: 2025-06-18 | End: 2025-06-18

## 2025-06-18 RX ORDER — PREDNISONE 20 MG/1
40 TABLET ORAL DAILY
Qty: 8 TABLET | Refills: 0 | Status: SHIPPED | OUTPATIENT
Start: 2025-06-19

## 2025-06-18 RX ORDER — ACETYLCYSTEINE 200 MG/ML
2 SOLUTION ORAL; RESPIRATORY (INHALATION) 4 TIMES DAILY
Status: DISCONTINUED | OUTPATIENT
Start: 2025-06-18 | End: 2025-06-18 | Stop reason: HOSPADM

## 2025-06-18 RX ORDER — AZITHROMYCIN 250 MG/1
250 TABLET, FILM COATED ORAL DAILY
Qty: 4 TABLET | Refills: 0 | Status: SHIPPED | OUTPATIENT
Start: 2025-06-18

## 2025-06-18 RX ORDER — ACETYLCYSTEINE 200 MG/ML
2 SOLUTION ORAL; RESPIRATORY (INHALATION) 4 TIMES DAILY PRN
Qty: 30 ML | Refills: 3 | Status: SHIPPED | OUTPATIENT
Start: 2025-06-18

## 2025-06-18 RX ORDER — ACETYLCYSTEINE 200 MG/ML
2 SOLUTION ORAL; RESPIRATORY (INHALATION) EVERY 4 HOURS
Status: DISCONTINUED | OUTPATIENT
Start: 2025-06-18 | End: 2025-06-18

## 2025-06-18 RX ADMIN — ACETYLCYSTEINE 2 ML: 200 SOLUTION ORAL; RESPIRATORY (INHALATION) at 16:06

## 2025-06-18 RX ADMIN — IPRATROPIUM BROMIDE AND ALBUTEROL SULFATE 3 ML: .5; 3 SOLUTION RESPIRATORY (INHALATION) at 11:33

## 2025-06-18 RX ADMIN — PREDNISONE 40 MG: 20 TABLET ORAL at 08:39

## 2025-06-18 RX ADMIN — IPRATROPIUM BROMIDE AND ALBUTEROL SULFATE 3 ML: .5; 3 SOLUTION RESPIRATORY (INHALATION) at 07:42

## 2025-06-18 RX ADMIN — IPRATROPIUM BROMIDE AND ALBUTEROL SULFATE 3 ML: .5; 3 SOLUTION RESPIRATORY (INHALATION) at 16:05

## 2025-06-18 ASSESSMENT — ACTIVITIES OF DAILY LIVING (ADL)
ADLS_ACUITY_SCORE: 41

## 2025-06-18 NOTE — CONSULTS
SW reviewed pt chart and discussed with care team. Pt has no discharge needs.    No further care management intervention anticipated at this time.  Please re-consult if further needs arise.  Care management signing off.

## 2025-06-18 NOTE — PROGRESS NOTES
OT: Orders received. Chart reviewed and discussed with care team.  OT not indicated due to no skilled need for OT identified; independent with mobility.  Defer discharge recommendations to Care Team.  Will complete orders.

## 2025-06-18 NOTE — PHARMACY-ADMISSION MEDICATION HISTORY
Pharmacy Intern Admission Medication History    Admission medication history is complete. The information provided in this note is only as accurate as the sources available at the time of the update.    Information Source(s): Patient and CareEverywhere/SureScripts via in-person    Pertinent Information:   Patient takes care of his own medications with the help of his ex-wife.   Patients adherence is not great but wants to be better and get his health under control.   Patient reports he is only taking inhalers and nebulize solution.   Patient reports he has been having a hard time getting his med[facility] at [facility].     Changes made to PTA medication list:  Added: All PTA meds listed below  Deleted: Metaxalone  Changed: None    Allergies reviewed with patient and updates made in EHR: yes    PTA Med List   Medication Sig Last Dose/Taking    albuterol (PROAIR HFA/PROVENTIL HFA/VENTOLIN HFA) 108 (90 Base) MCG/ACT inhaler Inhale 2 puffs into the lungs every 4 hours as needed for shortness of breath, wheezing or cough. 6/17/2025 Noon    Albuterol Sulfate 2.5 MG/0.5ML NEBU Inhale 2.5 mg into the lungs every 4 hours as needed for shortness of breath, wheezing or cough. Taking As Needed    tiotropium-olodaterol 2.5-2.5 MCG/ACT AERS Inhale 1 puff into the lungs every other day. 6/15/2025       Medications Available during hospital stay: NONE    Medication History Completed By: Daisy Hogan  6/17/2025 8:38 PM

## 2025-06-18 NOTE — PROGRESS NOTES
RESPIRATORY CARE NOTE     Patient Name: Satnam Kaminski  Today's Date: 6/18/2025       Pt continues to receive duonebs w/ flutter QID. BS are expiratory wheezing and occasionally coarse . Pt is on 3L of oxygen via oxymask, SpO2 is 96%. RT just decreased to 2 lpm. Post treatment there is no change. Pt perceives improvement.  RT encouraged deep breathing and coughing techniques .  RT will continue to monitor and assess.   Laura Benitez, RT

## 2025-06-18 NOTE — H&P
[facility]    History and Physical - Hospitalist Service       Date of Admission:  6/17/2025    Assessment & Plan      Satnam Kaminski is a 54 year old male admitted on 6/17/2025. He has a history of COPD emphysema and is admitted for COPD Exacerbation secondary to superimposed pneumonia.      COPD Excerebration secondary to superimposed Pneumonia   Sepsis, resolving   The patient presents with shortness of breath, productive cough, chills, and sweats for the past few days. At the ED, the patient was initially placed on BiPAP and Duonebs, and was able to wean him off to oxymask. The patient is tachycardic and has an elevated RR of 35. EKG with no ischemic arrhythmia. Serial troponin were WNL from 14 and 12, respectively. D-dimer was negative. Leukocytosis of 22.5. Initially, venous CO2 was increased at 58 (elevated), but an arterial CO2 of 45 (WNL). CT Chest showed Patient has marked emphysema and changes of acute bronchitis and bronchiolitis with small areas of bronchopneumonia and retained endobronchial secretions, greatest in the lower lobes. Initial Lactic acid at 2.2 so patient met sepsis criteria. Fortunately, repeat 1.3 (WNL). The patient received Azithromycin 500mg and IV ceftriaxone.  - Azithromycin 500mg BID for 5 days   - Ceft 1g q24 hours   - Prednisone 40mg daily for 5 days  - Duonebs q4 hours   - Incentive spirometry   - Oxymask, wean off when appropriate   - PT/OT/SW        Diet: Combination Diet Regular Diet Adult  DVT Prophylaxis: Enoxaparin (Lovenox) SQ  Hernandez Catheter: Not present  Fluids: PO  Lines: None     Cardiac Monitoring: None  Code Status: No CPR- Do NOT Intubate    Clinically Significant Risk Factors Present on Admission          # Hypochloremia: Lowest Cl = 94 mmol/L in last 2 days, will monitor as appropriate                # Acute Hypoxic Respiratory Failure: Based on blood gas results. Continue supplemental oxygen as needed                   Disposition  Plan      Expected Discharge Date: 06/19/2025                The patient's care was discussed with the Attending Physician, Dr. Mickey Bassett. Day Attending Physician, Dr. Sherri Monteiro, will assess the patient in the morning.       Víctor Sexton DO, PGY1  Hospitalist Service  [facility]  Securely message with [employer] (more info)  Text page via [facility] Paging/Directory   ______________________________________________________________________    Chief Complaint   COPD Exacerbation     History is obtained from the patient and ex-wife, Carline.    History of Present Illness   Satnam Kaminski is a 54 year old male who has a history of smoking 3 packs a day for 45 years with minimal care and is admitted for COPD exacerbation superimposed with bronchopneumonia.     The patient reports that of chills, night sweats, productive cough, and shortness of breath 3-4 days. He also reports of 1 instance of chest pain that made it hard for him to take deeper breaths a few days ago, but no longer has chest pain. He lives with his brother who happened to have a pulse oximetry at home. Earlier today, the patient reported that his oxygen saturation went down as low to 82%. His ex-wife prompted him to be evaluated here at [facility]' ED.     At the ED, the patient was initially placed on BiPAP and Duonebs, and was able to wean him off to oxymask. EKG did not show any evidence of ischemic arrhythmia. Serial troponin were WNL from 14 and 12, respectively. D-dimer was negative. Leukocytosis of 22.5. Initial Lactic acid at 2.2, repeat 1.3 (WNL). Initially, venous CO2 was increased at 58 (elevated), but an arterial CO2 of 45 (WNL). CT Chest showed Patient has marked emphysema and changes of acute bronchitis and bronchiolitis with small areas of bronchopneumonia and retained endobronchial secretions, greatest in the lower lobes. The patient received Azithromycin 500mg and IV ceftriaxone, and duonebs. Since admission, the  patient felt improved.     The patient wishes to be DNR/DNI.    Past Medical History    No past medical history on file.    Past Surgical History   No past surgical history on file.    Prior to Admission Medications   Prior to Admission Medications   Prescriptions Last Dose Informant Patient Reported? Taking?   Albuterol Sulfate 2.5 MG/0.5ML NEBU   Yes Yes   Sig: Inhale 2.5 mg into the lungs every 4 hours as needed for shortness of breath, wheezing or cough.   albuterol (PROAIR HFA/PROVENTIL HFA/VENTOLIN HFA) 108 (90 Base) MCG/ACT inhaler 6/17/2025 Noon  Yes Yes   Sig: Inhale 2 puffs into the lungs every 4 hours as needed for shortness of breath, wheezing or cough.   tiotropium-olodaterol 2.5-2.5 MCG/ACT AERS 6/15/2025  Yes Yes   Sig: Inhale 1 puff into the lungs every other day.      Facility-Administered Medications: None        Review of Systems    The 10 point Review of Systems is negative other than noted in the HPI or here.     Social History   I have reviewed this patient's social history and updated it with pertinent information if needed.  Social History     Tobacco Use    Smoking status: Some Days     Types: Cigarettes   Marijuana use: smokes one a day.       Family History   Mother- Cancer   Maternal grandmother- Cancer      Physical Exam   Vital Signs: Temp: 98.3  F (36.8  C) Temp src: Oral BP: (!) 147/83 Pulse: 107   Resp: (!) 35 SpO2: (!) 87 % O2 Device: Nasal cannula Oxygen Delivery: 3 LPM  Weight: 147 lbs 0 oz  Constitutional: Awake, alert, cooperative, no apparent distress, and appears stated age  Eyes: Lids and lashes normal, pupils equal, round and reactive to light, extra ocular muscles intact, sclera clear, conjunctiva normal  ENT: Normocephalic, without obvious abnormality, atraumatic,  Respiratory: + Diffuse wheezing throughout bilateral lung fields. No increased work of breathing, good air exchange, clear to auscultation bilaterally, no crackles  Cardiovascular: Regular rate and rhythm,  normal S1 and S2, no S3 or S4, and no murmur noted  GI: No scars, normal bowel sounds, soft, non-distended, non-tender, no masses palpated, no hepatosplenomegaly  Musculoskeletal: There is no redness, warmth, or swelling of the joints. Tone is normal.  Neurologic: Awake, alert, oriented to name, place and time.    Neuropsychiatric: General: normal, calm, and normal eye contact      Medical Decision Making   Please see A&P for additional details of medical decision making.      Data     I have personally reviewed the following data over the past 24 hrs:    22.5 (H)  \   16.2   / 274     136 94 (L) 14.5 /  112 (H)   4.3 30 (H) 0.83 \     Trop: 12 BNP: N/A     Procal: N/A CRP: N/A Lactic Acid: 1.3       INR:  N/A PTT:  N/A   D-dimer:  0.41 Fibrinogen:  N/A       Imaging results reviewed over the past 24 hrs:   Recent Results (from the past 24 hours)   XR Chest Port 1 View    Narrative    EXAM: XR CHEST PORT 1 VIEW  LOCATION: [facility]  DATE: 6/17/2025    INDICATION: Dyspnea  COMPARISON: 6/2/2024.      Impression    IMPRESSION: Mild hyperinflation with some minimal fibrosis. Chest otherwise negative. No acute findings.   Chest CT w/o contrast   Result Value    Radiologist flags See above report. (Urgent)    Narrative    EXAM: CT CHEST W/O CONTRAST  LOCATION: [facility]  DATE: 6/17/2025    INDICATION: Dyspnea, concern for PNA not seen on CXR  COMPARISON: Chest x-ray earlier today  TECHNIQUE: CT chest without IV contrast. Multiplanar reformats were obtained. Dose reduction techniques were used.  CONTRAST: None.    FINDINGS:   LUNGS AND PLEURA: Pulmonary abnormalities are noted bilaterally. Specifically, peribronchiolar groundglass opacities noted bilaterally with more focal areas of heterogeneous opacification with thickened walls about the extensive emphysema. This is most   notable superiorly in the left upper lobe (axial image 88) and multiple smaller areas  throughout the left lower lobe (axial images 2 76-91). Peribronchial thickening with areas of retained secretions noted in both lower lobe bronchi as well as the   subsegmental branches where many are occluded in the lower lobes. No effusions.    MEDIASTINUM/AXILLAE: Saber-sheath trachea. No concerning adenopathy. Aorta is normal caliber.      CORONARY ARTERY CALCIFICATION: None.    UPPER ABDOMEN: Unremarkable.    MUSCULOSKELETAL: No concerning bone lesions.      Impression    IMPRESSION:   1.  Patient has  marked emphysema and changes of acute bronchitis and bronchiolitis with small areas of bronchopneumonia and retained endobronchial secretions, greatest in the lower lobes.  2.  Suggest a follow-up chest CT posttreatment in 3 months.    [Access Center: See above report.]    This report will be copied to the [facility] to ensure a provider acknowledges the finding. Access Center is available Monday through Friday 8am-3:30 pm.

## 2025-06-18 NOTE — PROGRESS NOTES
Pt remain off BiPAP and continue on 2L of oxygen with sat in the upper 90's. Neb treatment given, pt tolerated well. Pt doesn't show any signs of distress at this time. Pt will continue to monitor and assess.    Katty Mcconnell, RT

## 2025-06-18 NOTE — CARE PLAN
06/18/25 1100   Home Oxygen Assessment (RN/RT ONLY)   Does patient have oxygen at home? No   1. SpO2 on room air at rest while awake 90       Oxygen LPM at rest 4   3. SpO2 on room air during Activity/with exercise 88   4. SpO2 with oxygen during activity/with exercise 92       Oxygen LPM during activity/with exercise 3   Does patient qualify for Home O2? Unknown

## 2025-06-18 NOTE — PLAN OF CARE
Goal Outcome Evaluation:  Pt is AOX4, able to make needs known. Pt has a history of breathing difficulty with COPD and is managing ways to fight this. Oxygen saturation was sufficient during Oxygen assessment. Pt is okay to discharge back home.    Care hours: [id number]

## 2025-06-18 NOTE — PLAN OF CARE
Goal Outcome Evaluation:      A&O, VSS, stating in 90's on 2L oximask overnight. Up independently in room, calls appropriately. Denies pain. Pt reported not feeling SOB overnight but had episodes of tachypnea.                    Problem: Adult Inpatient Plan of Care  Goal: Optimal Comfort and Wellbeing  Outcome: Progressing     Problem: Gas Exchange Impaired  Goal: Optimal Gas Exchange  Outcome: Progressing

## 2025-06-18 NOTE — PROVIDER NOTIFICATION
06/18/25 1133   RCAT Assessment   Reason for Assessment COPD   Pulmonary Status 4   Surgical Status 0   Chest X-ray 3   Respiratory Pattern 1   Mental Status 0   Breath Sounds 4   Cough Effectiveness 0   Level of Activity 0   O2 Required for SpO2>=92% 1   Acuity Level (points) 13   Acuity Level  3   Re-eval Interval Guideline Every 3 days   Re-evaluation Date 06/21/25   $RT Consult Time Spent RCAT (30 minute increments) 1   Clinical Indications/Symptoms   Aerosol Therapy Physician order;RCAT protocol   Broncho-pulmonary Hygiene Rhonchi on auscultation   Volume Expansion Prevent atelectasis   Aerosol Therapy Plan   RT Treatment Nebulizer   Anticholinergic/Beta-Andrenergic Agonist Duoneb soln (0.5mg/3mg per 3mL) neb Max 6 doses/24h   Aerosol Treatment Frequency Acuity Level 3: QID/PRN @noc-Mod wheezing/Hx asthma/secretion removal   Aerosol Therapy (SVN)   Respiratory Treatment Status (SVN) given   Patient Position HOB elevated   Broncho-Pulmonary Hygiene Plan   Broncho-Pulmonary Hygiene Treatment OPEP therapy   Broncho-Pulm Hygiene Frequency   (per MD order QID to go with neb treatments)   Volume Expansion Plan   Volume Expansion Treatment Incentive Spirometer   Volume Expansion Frequency Acuity Level 3: TID-At risk for developing atelectasis  (with RN)   Breath Sounds   Breath Sounds All Fields   All Lung Fields Breath Sounds wheezes, expiratory     Pt is on 2 lpm NC SpO2 94%, BS exp wheezes. RR 20-22. Pt has a strong non productive cough. Pt has nebs and inhalers at home but no oxygen. Pt has smoked since he was 9 years old and has smoked 3 ppd on average. RT to continue QID Duo nebs per RCAT, and Flutter QID per MD order to be in line with nebs. IS Q2 hours W/A is already ordered per protocol with RN. Rt to re-eval; RCAT in Q72 hrs.   Laura Benitez, RT

## 2025-06-29 ENCOUNTER — HEALTH MAINTENANCE LETTER (OUTPATIENT)
Age: 55
End: 2025-06-29